# Patient Record
(demographics unavailable — no encounter records)

---

## 2025-07-03 NOTE — PHYSICAL EXAM
[Right] : right foot and ankle [Moderate] : moderate swelling of lateral ankle [5th] : 5th [4___] : inversion 4[unfilled]/5 [5___] : eversion 5[unfilled]/5 [] : no metatarsal tenderness [FreeTextEntry8] : ttp distal fibula [de-identified] :  pain with strength testing.

## 2025-07-03 NOTE — DISCUSSION/SUMMARY
[de-identified] : 14m with right ankle sprain.  The patient was advised of the diagnosis. The natural history of the pathology was explained in full to the patient in layman's terms. All questions were answered.  1) ASO Brace was appropriately fitted and dispensed. Pt left the office with brace as stated above. 2) patient is instructed to start physical therapy 3) cryotherapy, rest and activity modification 4) rtc 10-14 days   Entered by Barbara Medeiros acting as scribe. Dr. Sotelo- The documentation recorded by the scribe accurately reflects the service I personally performed and the decisions made by me.

## 2025-07-03 NOTE — REASON FOR VISIT
denies pain/discomfort (Rating = 0) [FreeTextEntry2] : R ankle pain  [DM Type 2] : DM Type 2 [Follow - Up] : a follow-up visit [Spouse] : spouse

## 2025-07-03 NOTE — HISTORY OF PRESENT ILLNESS
[3] : 3 [de-identified] : 07/03/2025 Mr. CAOI ECHAVARRIA, a 14-year-old male ([employer]- 10th grade, football/lacrosse), presents today with mother in the exam room for R ankle pain. Reports doing football drills on 6/30/25 when he rolled his ankle. Experiencing swelling and tight pain while WB. Applied ice and elevated. No prior treatment.  [] : no [FreeTextEntry1] : R ankle